# Patient Record
Sex: FEMALE | Race: WHITE | NOT HISPANIC OR LATINO | Employment: PART TIME | URBAN - METROPOLITAN AREA
[De-identification: names, ages, dates, MRNs, and addresses within clinical notes are randomized per-mention and may not be internally consistent; named-entity substitution may affect disease eponyms.]

---

## 2020-10-12 ENCOUNTER — APPOINTMENT (OUTPATIENT)
Dept: GENERAL RADIOLOGY | Facility: HOSPITAL | Age: 25
End: 2020-10-12

## 2020-10-12 ENCOUNTER — HOSPITAL ENCOUNTER (EMERGENCY)
Facility: HOSPITAL | Age: 25
Discharge: HOME | End: 2020-10-12
Attending: EMERGENCY MEDICINE | Admitting: EMERGENCY MEDICINE

## 2020-10-12 VITALS
RESPIRATION RATE: 16 BRPM | SYSTOLIC BLOOD PRESSURE: 120 MMHG | DIASTOLIC BLOOD PRESSURE: 80 MMHG | HEIGHT: 67 IN | TEMPERATURE: 98.1 F | WEIGHT: 127.21 LBS | HEART RATE: 85 BPM | BODY MASS INDEX: 19.97 KG/M2 | OXYGEN SATURATION: 100 %

## 2020-10-12 DIAGNOSIS — S93.401A SPRAIN OF RIGHT ANKLE, UNSPECIFIED LIGAMENT, INITIAL ENCOUNTER: Primary | ICD-10-CM

## 2020-10-12 PROCEDURE — 73630 X-RAY EXAM OF FOOT: CPT

## 2020-10-12 PROCEDURE — 99282 EMERGENCY DEPT VISIT SF MDM: CPT

## 2020-10-12 RX ORDER — BUSPIRONE HYDROCHLORIDE 15 MG/1
15 TABLET ORAL 3 TIMES DAILY
COMMUNITY
End: 2022-01-20

## 2020-10-12 NOTE — ED NOTES
Pt presents with right ankle/foot pain swelling, bruising and numbness. Pt was sent over from the  at [facility]. They were concerned with the numbness and cold extremity. Pt reports she was hiking yesterday and twisted her ankle.  provided a short walking boot.      Sarah Jacques, HERLINDA  10/12/20 [id number]       Jacques, Sarah, RN  10/12/20 [id number]

## 2020-10-12 NOTE — ED PROVIDER NOTES
Subjective   History of Present Illness  25-year-old female presents after was sent here from urgent care.  She had injured her ankle yesterday when she was on a hill side twisted it and fell.  They had placed her in a short cam walker.  They were concerned that she might have a blood clot as she has some decreased capillary refill.  She has had some pain and bruising.  She complains of some numbness in her foot.  Review of Systems  Negative for knee pain or other injury  Past Medical History:   Diagnosis Date   • ADHD (attention deficit hyperactivity disorder)    • Depression        Allergies   Allergen Reactions   • Latex Rash   • Adhesive Tape Swelling       History reviewed. No pertinent surgical history.    Family History   Problem Relation Age of Onset   • Heart attack Father        Social History     Socioeconomic History   • Marital status: Single     Spouse name: Not on file   • Number of children: Not on file   • Years of education: Not on file   • Highest education level: Not on file   Tobacco Use   • Smoking status: Never Smoker   • Smokeless tobacco: Never Used   Substance and Sexual Activity   • Alcohol use: Yes     Frequency: Never   • Drug use: Defer   • Sexual activity: Defer     Prior to Admission medications    Medication Sig Start Date End Date Taking? Authorizing Provider   busPIRone (BUSPAR) 15 MG tablet Take 15 mg by mouth 3 (Three) Times a Day.   Yes Provider, MD Bhargav   methylphenidate 36 MG CR tablet  10/12/20  Yes Emergency, Nurse Marina, RN   Viibryd 40 MG tablet tablet  9/28/20  Yes Emergency, Nurse Epic, RN   ARIPiprazole (ABILIFY) 5 MG tablet  10/12/20   Emergency, Nurse Marina, RN   sertraline (ZOLOFT) 50 MG tablet SERTRALINE HCL 50 MG TABS 2/5/13 10/12/20  Emergency, Nurse Epic, RN           Objective   Physical Exam  24-year-old female awake alert.  Generally well-developed well-nourished.  Examination of right leg reveals no knee tenderness.  She is noted to have bruising some  soft tissue swelling to the dorsal lateral aspect of foot and ankle.  She has some tenderness to the dorsal lateral aspect of proximal foot and ankle.  She is neuro vas intact distally.  She has normal pulses.  Procedures           ED Course                                           MDM  Patient x-ray of ankle was reviewed found to be negative.  She had x-ray of foot obtained which was also found to be negative.  Patient's findings were discussed with her.  She has normal pulses.  I am not concerned about a DVT.  She was placed back in her cam walker as she did not feel it was uncomfortable against her calf.  She is to wear this as needed restrict ambulation as needed.  To ice elevate ankle return new or worsening symptoms.  She was given given podiatry follow-up.  Given follow-up with dr boucher  Final diagnoses:   Sprain of right ankle, unspecified ligament, initial encounter            José Luis Garcia MD  10/15/20 [id number]

## 2020-10-12 NOTE — DISCHARGE INSTRUCTIONS
Restrict ambulation as needed this week, Tylenol, ibuprofen for pain, return new or worsening symptoms.  Elevate ankle when possible the next few days.

## 2022-01-20 PROCEDURE — U0004 COV-19 TEST NON-CDC HGH THRU: HCPCS | Performed by: FAMILY MEDICINE
